# Patient Record
Sex: MALE | Race: WHITE | NOT HISPANIC OR LATINO | URBAN - METROPOLITAN AREA
[De-identification: names, ages, dates, MRNs, and addresses within clinical notes are randomized per-mention and may not be internally consistent; named-entity substitution may affect disease eponyms.]

---

## 2020-11-25 ENCOUNTER — OUTPATIENT (OUTPATIENT)
Dept: OUTPATIENT SERVICES | Facility: HOSPITAL | Age: 25
LOS: 1 days | End: 2020-11-25
Payer: MEDICARE

## 2020-11-25 DIAGNOSIS — Z11.59 ENCOUNTER FOR SCREENING FOR OTHER VIRAL DISEASES: ICD-10-CM

## 2020-11-25 LAB — SARS-COV-2 RNA SPEC QL NAA+PROBE: SIGNIFICANT CHANGE UP

## 2020-11-25 PROCEDURE — U0003: CPT

## 2020-11-26 DIAGNOSIS — Z11.59 ENCOUNTER FOR SCREENING FOR OTHER VIRAL DISEASES: ICD-10-CM

## 2023-08-28 ENCOUNTER — EMERGENCY (EMERGENCY)
Facility: HOSPITAL | Age: 28
LOS: 0 days | Discharge: HOME | End: 2023-08-28
Attending: EMERGENCY MEDICINE
Payer: COMMERCIAL

## 2023-08-28 VITALS
OXYGEN SATURATION: 100 % | RESPIRATION RATE: 18 BRPM | HEART RATE: 75 BPM | TEMPERATURE: 98 F | SYSTOLIC BLOOD PRESSURE: 140 MMHG | DIASTOLIC BLOOD PRESSURE: 76 MMHG

## 2023-08-28 VITALS — HEIGHT: 71 IN | WEIGHT: 145.06 LBS

## 2023-08-28 DIAGNOSIS — S61.213A LACERATION WITHOUT FOREIGN BODY OF LEFT MIDDLE FINGER WITHOUT DAMAGE TO NAIL, INITIAL ENCOUNTER: ICD-10-CM

## 2023-08-28 DIAGNOSIS — S61.214A LACERATION WITHOUT FOREIGN BODY OF RIGHT RING FINGER WITHOUT DAMAGE TO NAIL, INITIAL ENCOUNTER: ICD-10-CM
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
## 2023-08-28 DIAGNOSIS — S61.212A LACERATION WITHOUT FOREIGN BODY OF RIGHT MIDDLE FINGER WITHOUT DAMAGE TO NAIL, INITIAL ENCOUNTER: ICD-10-CM

## 2023-08-28 DIAGNOSIS — S61.511A LACERATION WITHOUT FOREIGN BODY OF RIGHT WRIST, INITIAL ENCOUNTER: ICD-10-CM
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
## 2023-08-28 PROCEDURE — 73140 X-RAY EXAM OF FINGER(S): CPT | Mod: LT

## 2023-08-28 PROCEDURE — 73110 X-RAY EXAM OF WRIST: CPT | Mod: 26,RT

## 2023-08-28 PROCEDURE — 99285 EMERGENCY DEPT VISIT HI MDM: CPT

## 2023-08-28 PROCEDURE — 73110 X-RAY EXAM OF WRIST: CPT | Mod: RT

## 2023-08-28 PROCEDURE — 73140 X-RAY EXAM OF FINGER(S): CPT | Mod: 26,LT

## 2023-08-28 PROCEDURE — 99284 EMERGENCY DEPT VISIT MOD MDM: CPT | Mod: 25

## 2023-08-28 PROCEDURE — 12001 RPR S/N/AX/GEN/TRNK 2.5CM/<: CPT

## 2023-08-28 RX ORDER — ACETAMINOPHEN 500 MG
650 TABLET ORAL ONCE
Refills: 0 | Status: COMPLETED | OUTPATIENT
Start: 2023-08-28 | End: 2023-08-28

## 2023-08-28 RX ADMIN — Medication 650 MILLIGRAM(S): at 22:20

## 2023-08-28 RX ADMIN — Medication 1 TABLET(S): at 22:20

## 2023-08-28 NOTE — ED STATDOCS - OBJECTIVE STATEMENT
Pt is a 28y male with no pertinent PMH presents to the ED after being bitten by his dog multiple times to his b/l hands and wrists. Patient is requesting MD Hand for consult. Dog immunizations, self immunizations, and Tdap UTD.

## 2023-08-28 NOTE — ED STATDOCS - CARE PROVIDER_API CALL
Demetrio Hand.  Orthopaedic Surgery  [address] [place] [address]  Phone: [phone]  Fax: [phone]  Follow Up Time:

## 2023-08-28 NOTE — ED STATDOCS - ATTENDING APP SHARED VISIT CONTRIBUTION OF CARE
Dr. José: I performed a face to face bedside interview with patient regarding history of present illness, review of symptoms and past medical history. I completed an independent physical exam.  I have discussed patient's plan of care with PA.   I agree with note as stated above, having amended the EMR as needed to reflect my findings.   This includes HISTORY OF PRESENT ILLNESS, HIV, PAST MEDICAL/SURGICAL/FAMILY/SOCIAL HISTORY, ALLERGIES AND HOME MEDICATIONS, REVIEW OF SYSTEMS, PHYSICAL EXAM, and any PROGRESS NOTES during the time I functioned as the attending physician for this patient.  PRADEEP José DO

## 2023-08-28 NOTE — ED STATDOCS - PROGRESS NOTE DETAILS
patient with multiple bite wounds requesting Dr. Hand for consult.  Case d/w Dr. Hand.  Orthopedic residents at [facility] for management.  reviewed follow up and return prcautions -Tarah Greenwood PA-C

## 2023-08-28 NOTE — ED STATDOCS - NS ED ATTENDING STATEMENT MOD
This was a shared visit with the TORSTEN. I reviewed and verified the documentation and independently performed the documented:

## 2023-08-28 NOTE — ED STATDOCS - CLINICAL SUMMARY MEDICAL DECISION MAKING FREE TEXT BOX
28y male w/ multiple dog bites on b/l fingers and wrists. Tet UTD. Known dogs, breaking up dog fight. Dog vaccines UTD. Get XR of L middle and R wrist to r/o foreign body or fracture. Hand consult with [facility] as per father request. Give Augmentin and possible suture depending on hand consult.

## 2023-08-28 NOTE — ED STATDOCS - MUSCULOSKELETAL, MLM
3cm laceration to the dorsum of the L middle finger w/ sensation intact and FROM. R wirst lateral volar aspect w/ 2cm laceration w/ sensation intact and FROM. Superficial laceration to the distal R middle finger. R 4th finger w/ dorsal distal superficial laceration, no active bleeding.

## 2023-08-28 NOTE — ED ADULT TRIAGE NOTE - CHIEF COMPLAINT QUOTE
patient bitten by his dog to bilateral hands and right wrist. multiple bites. dog's immunizations utd.

## 2023-08-28 NOTE — ED STATDOCS - PATIENT PORTAL LINK FT
You can access the FollowMyHealth Patient Portal offered by [facility] by registering at the following website: [email or web address]. By joining [employer]’s FollowMyHealth portal, you will also be able to view your health information using other applications (apps) compatible with our system.

## 2023-08-29 NOTE — CONSULT NOTE ADULT - SUBJECTIVE AND OBJECTIVE BOX
28y Male community ambulatory presents c/o L middle finger Laceration with large skin flap over PIP joint with exposed extensor tendons and 1cm lacs over volar and dorsal R wrist with exposed tendon proximally. Pt reports he was breaking up a fight between his dog and another dog when he was bitten. Family reports patient and dog up to date on all vaccines. No exposed bone visualized. Denies numbness/tingling, endorsing some reduced sensation over radial aspect of distal left third finger. No other pain/injuries. Denies fevers/chills. The patient reports last tetanus within past 1-3 years.       HEALTH ISSUES - PROBLEM Dx:        MEDICATIONS  (STANDING):    Allergies    Allergy Status Unknown    Intolerances          Vital Signs Last 24 Hrs  T(C): 36.9 (08-28-23 @ 20:47), Max: 36.9 (08-28-23 @ 20:47)  T(F): 98.4 (08-28-23 @ 20:47), Max: 98.4 (08-28-23 @ 20:47)  HR: 75 (08-28-23 @ 20:47) (75 - 75)  BP: 140/76 (08-28-23 @ 20:47) (140/76 - 140/76)  BP(mean): 95 (08-28-23 @ 20:47) (95 - 95)  RR: 18 (08-28-23 @ 20:47) (18 - 18)  SpO2: 100% (08-28-23 @ 20:47) (100% - 100%)      Imaging: x-ray shows no fracture    Physical Exam  Gen: NAD  LUE: Warm/well perfused, at L middle finger, 6cm laceration noted to dorsal aspect of the L middle finger over PIP,  Patient sensation intact to light touch with mild reduction in sensation over radial aspect of L distal middle finger, with intact flexion/extension at PIP/DIP, FDS/FDP intact.   RUE: 1cm lac over dorsal wrist with fat and muscle exposed, 1cm lac over volar wrist with exposed tendon, tendons appear in tact, NVI RUE  Procedure: L hand and R wrist was prepped and draped in sterile fashion, 10cc of 1% lidocaine was used to perform a metacarpal digit block of the L middle finger. A second 10cc 1%lidocaine was used to locally block the lacerations to R wrist.  The laceration was cleaned and approximated using 4-0 chromic gut sutures. The tourniquet was let down with subsequent brisk capillary refill at the tip of the digit. The laceration was dressed with xeroform and gauze. The patient tolerated the procedure well. NVI post-procedure.       A/P: 28y Male with L middle finger laceration and R wrist lacerations, no obvious tendon injury   Pain control  Keep dressing clean and dry  Antibiotics - augmentin   All question answered  Follow up with Dr. Hand 8/29 call office for appointment   Ortho stable for discharge